# Patient Record
Sex: FEMALE | Race: WHITE
[De-identification: names, ages, dates, MRNs, and addresses within clinical notes are randomized per-mention and may not be internally consistent; named-entity substitution may affect disease eponyms.]

---

## 2023-05-03 VITALS — DIASTOLIC BLOOD PRESSURE: 75 MMHG | SYSTOLIC BLOOD PRESSURE: 115 MMHG

## 2023-05-10 ENCOUNTER — HOSPITAL ENCOUNTER (OUTPATIENT)
Dept: HOSPITAL 46 - DS | Age: 28
Discharge: HOME | End: 2023-05-10
Attending: OBSTETRICS & GYNECOLOGY
Payer: COMMERCIAL

## 2023-05-10 VITALS — HEIGHT: 62 IN | WEIGHT: 182.37 LBS | BODY MASS INDEX: 33.56 KG/M2

## 2023-05-10 VITALS — SYSTOLIC BLOOD PRESSURE: 111 MMHG | DIASTOLIC BLOOD PRESSURE: 79 MMHG

## 2023-05-10 VITALS — DIASTOLIC BLOOD PRESSURE: 77 MMHG | SYSTOLIC BLOOD PRESSURE: 126 MMHG

## 2023-05-10 VITALS — SYSTOLIC BLOOD PRESSURE: 113 MMHG | DIASTOLIC BLOOD PRESSURE: 72 MMHG

## 2023-05-10 VITALS — SYSTOLIC BLOOD PRESSURE: 127 MMHG | DIASTOLIC BLOOD PRESSURE: 81 MMHG

## 2023-05-10 DIAGNOSIS — K80.44: Primary | ICD-10-CM

## 2023-05-10 DIAGNOSIS — N94.4: ICD-10-CM

## 2023-05-10 DIAGNOSIS — N73.6: ICD-10-CM

## 2023-05-10 PROCEDURE — 0FT44ZZ RESECTION OF GALLBLADDER, PERCUTANEOUS ENDOSCOPIC APPROACH: ICD-10-PCS | Performed by: SURGERY

## 2023-05-10 PROCEDURE — 3E0P4GC INTRODUCTION OF OTHER THERAPEUTIC SUBSTANCE INTO FEMALE REPRODUCTIVE, PERCUTANEOUS ENDOSCOPIC APPROACH: ICD-10-PCS | Performed by: SURGERY

## 2023-05-10 PROCEDURE — 0UN24ZZ RELEASE BILATERAL OVARIES, PERCUTANEOUS ENDOSCOPIC APPROACH: ICD-10-PCS | Performed by: SURGERY

## 2023-05-10 PROCEDURE — BF121ZZ FLUOROSCOPY OF GALLBLADDER USING LOW OSMOLAR CONTRAST: ICD-10-PCS | Performed by: SURGERY

## 2023-05-10 NOTE — OR
[facility]
                                    [address], [place]  [address]
_________________________________________________________________________________________
                                                                 Draft    
 
 
DATE OF OPERATION:
05/10/2023
 
SURGEON:
Lin Capps MD
 
PREOPERATIVE DIAGNOSIS:
Primary dysmenorrhea, dyspareunia.
 
POSTOPERATIVE DIAGNOSIS:
Primary dysmenorrhea, dyspareunia with extensive pelvic adhesions.
 
PROCEDURE:
Laparoscopy with lysis of adhesions, chromopertubation of the tubes.
 
ESTIMATED BLOOD LOSS:
Minimal.
 
DRAINS:
None.
 
INDICATIONS AND FINDINGS:
The patient is a 28-year-old female, who has been having worsening dysmenorrhea, pelvic
pain and dyspareunia.  They do desire pregnancy, so oral contraceptives and IUDs were
contraindicated.  At the time of surgery, her exam under anesthesia was normal.  At the
time of laparoscopy, there were extensive filmy pelvic adhesions across the posterior
cul-de-sac, around both tubes and ovaries.  The left tube was found to be blocked, but
the right tube did have spill of dye.  There was no evidence of endometriosis. 
 
DESCRIPTION OF PROCEDURE:
The patient was prepped and draped in the dorsal lithotomy position.  A weighted
speculum was placed.  The anterior lip of the cervix was visualized and grasped with a
single-tooth tenaculum.  The Ajit cannula was introduced and the speculum removed.
Attention was directed above.  The infraumbilical area was injected with 0.5% Marcaine
plain.  An incision was made with a knife, and each layer was serially elevated, incised
until the fascia was opened and identified.  Stay sutures of 0 Vicryl were placed.  The
peritoneum was opened bluntly and Julia cannula was placed and the balloon inflated and
tied into place.  Placement of the scope confirmed proper positioning.  CO2 was then
introduced in the abdomen.  The abdomen was appropriately distended, a secondary port
was placed on the patient's left side.  This was slightly below the level of the
umbilicus and lateral.  The area was transilluminated, injected with the Marcaine.
Incision was made with a knife and the trocar placed under direct vision.  Following
 
                                                                                    
_________________________________________________________________________________________
PATIENT NAME:     JUAN A BARRERA                     
MEDICAL RECORD #: [id number]            OPERATIVE REPORT              
          ACCT #: [id number]  
DATE OF BIRTH:   [birth year]            REPORT #: [id number]      
PHYSICIAN:        LIN CAPPS MD            
PCP:              NO PRIMARY CARE PHYSICIAN     
REPORT IS CONFIDENTIAL AND NOT TO BE RELEASED WITHOUT AUTHORIZATION
 
 
                                  [facility]
                                    [address], [place]  [address]
_________________________________________________________________________________________
                                                                 Draft    
 
 
this, the pelvis was visualized and the extensive adhesions found.  A 3rd puncture site
was made on the patient's right side and this was done in the same manner.  This was
another 5 mm port.  The LigaSure Maryland device was then used to serially coagulate and
divide the adhesions across the posterior cul-de-sac and around the ovaries bilaterally.
 The ovaries were freed up nicely as was the cul-de-sac.  There was a deep window in the
posterior cul-de-sac, though investigation of this window did not show any endometriosis
at the base.  The contour of the uterus did appear to have maybe a small fibroid
anteriorly.  After lysis of all the adhesions, chromopertubation was done and the left
tube did not have any fill or spill.  The right tube did have nice fill and spill and
the fimbriated end on the patient's right did appear more normal compared to the left.
Following this, the abdomen was irrigated and inspected and the fluid was removed.
Preparations were made for closure.  The instruments were removed from the side ports,
though the Julia was left in place as the patient was undergoing lap bentley by different
surgeon.  The abdominal incisions were closed with subcuticular sutures of 3-0 Vicryl
Rapide.  Attention was directed down below. The instruments were removed.  There was no
evidence of any bleeding from the tenaculum site.  The White catheter placed at the
beginning of the case was also removed.  She tolerated this portion of the procedure
well and the sponge and needle counts were correct for this portion as well.  Dr. Garay
as a co-surgeon is completing his op note for the lap bentley. 
 
 
 
            ________________________________________
            Lin Capps MD 
 
 
PJW/MODL
Job #:  [id number]
DD:  05/10/2023 10:47:56
DT:  05/10/2023 11:39:25
 
 
Copies:                                
~
 
 
 
 
 
 
 
 
 
                                                                                    
_________________________________________________________________________________________
PATIENT NAME:     JUAN A BARRERA                     
MEDICAL RECORD #: [id number]            OPERATIVE REPORT              
          ACCT #: [id number]  
DATE OF BIRTH:   [birth year]            REPORT #: [id number]      
PHYSICIAN:        LIN CAPPS MD            
PCP:              NO PRIMARY CARE PHYSICIAN     
REPORT IS CONFIDENTIAL AND NOT TO BE RELEASED WITHOUT AUTHORIZATION

## 2023-05-11 NOTE — OR
[facility]
                                    [address], [place]  [address]
_________________________________________________________________________________________
                                                                 Signed   
 
 
DATE OF OPERATION:
05/10/2023
 
SURGEON:
Herminia Hernandez MD
 
PREOPERATIVE DIAGNOSIS:
Chronic cholecystitis with biliary colic.
 
POSTOPERATIVE DIAGNOSIS:
Chronic cholecystitis with biliary colic.
 
PROCEDURE:
Laparoscopic cholecystectomy without intraoperative cholangiogram.
 
ESTIMATED BLOOD LOSS:
None.
 
INDICATIONS:
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
[employer].  She and her  have been under evaluation for infertility.
She has been working closely with her gynecologist.  She is planning diagnostic
laparoscopy and possible ablation of endometriosis.  However, she was having right upper
quadrant abdominal pain radiating through to her back.  It was worse with meals.  She
has had to cut her food way down.  She has been for at least two months.  The ultrasound
was unremarkable of the gallbladder.  HIDA scan showed the liver unremarkable with a
gallbladder ejection fraction of 75%.  However, injection of the CCK reproduced her
symptoms.  In fact, she told me it was quite miserable.  She came today to discuss her
gallbladder surgery at the time of her diagnostic laparoscopy.  In the office, I gave
her a booklet on the gallbladder.  We went through it page by page.  She understands the
location and function of the gallbladder.  We reviewed laparoscopic versus open
cholecystectomy.  There is risk including, but not limited to bleeding, infection,
scarring, change in contour of the skin, damage to bowel, damage to main bile duct,
incisional hernias and other unforeseen comorbidities.  She understands the expected
intraop and postop course.  She had expressed understanding and wished to proceed. 
 
PROCEDURE NOTE:
Juan A had already been in the operating room with our gynecology service.  She has
undergone laparoscopy and lysis of rather significant adhesions in her pelvis.  Please
see Dr. Capps's operative report for those details.  Once the gynecologic procedure was
finished, I came in her room for her gallbladder surgery.  We kept the trocar in place
below the umbilicus.  We then added our two trocars in the right subcostal margin as
 
    Electronically Signed By: HERMINIA HERNANDEZ MD  05/11/23 0732
_________________________________________________________________________________________
PATIENT NAME:     JUAN A BARRERA                     
MEDICAL RECORD #: [id number]            OPERATIVE REPORT              
          ACCT #: [id number]  
DATE OF BIRTH:   [birth year]            REPORT #: [id number]      
PHYSICIAN:        HERMINIA HERNANDEZ MD             
PCP:              NO PRIMARY CARE PHYSICIAN     
REPORT IS CONFIDENTIAL AND NOT TO BE RELEASED WITHOUT AUTHORIZATION
 
 
                                  [facility]
                                    [address] [place] [address]  [address]
_________________________________________________________________________________________
                                                                 Signed   
 
 
well as the subxiphoid trocar under direct visualization of the camera without
difficulty.  She had no adhesions around the liver or gallbladder.  The gallbladder was
grasped and elevated in the right upper quadrant.  The triangle of Calot was dissected
free with the help of a Maryland dissector.  She was in good position on this particular
operating room table for cholangiogram.  In addition, she has no gallstones.  We decided
to forego the cholangiogram.  We placed three sequential clips across the cystic duct
and it was divided.  We placed two clips across the cystic artery and it was divided.
The gallbladder was then removed from the gallbladder fossa with the help of the
cautery.  The gallbladder was placed into an EndoCatch bag.  After this, we used our
laparoscopic suturing device to pass 0-Vicryl suture on either side of the fascia of the
subxiphoid trocar site.  This was tied down to close this fascia primarily.  After this,
the gas was allowed to escape and the remaining trocars were removed.  We closed the
fascia of the infraumbilical trocar site with interrupted figure-of-eight 0 Vicryl
sutures.  Local anesthetic was injected into all trocar sites.  Each trocar site was
irrigated and suctioned out until clear.  The skin and dermis of each trocar site were
closed with interrupted 3-0 subcuticular and Monocryl sutures.  Dry gauze and tape were
then applied to all incisions.  After this, Juan A was awakened from anesthesia,
extubated in the OR, and taken to the recovery room in stable condition. 
 
 
 
            ________________________________________
            Herminia Hernandez MD 
 
 
ALB/MODL
Job #:  [id number]
DD:  05/10/2023 10:24:39
DT:  05/10/2023 10:59:43
 
cc:            MD Sangita [place] MD
 
 
Copies:  HERMINIA HERNANDEZ MD, PATRICIA J MD
~
 
 
 
 
 
    Electronically Signed By: HERMINIA HERNANDEZ MD  05/11/23 0732
_________________________________________________________________________________________
PATIENT NAME:     JUAN A BARRERA                     
MEDICAL RECORD #: [id number]            OPERATIVE REPORT              
          ACCT #: [id number]  
DATE OF BIRTH:   [birth year]            REPORT #: [id number]      
PHYSICIAN:        HERMINIA HERNANDEZ MD             
PCP:              NO PRIMARY CARE PHYSICIAN     
REPORT IS CONFIDENTIAL AND NOT TO BE RELEASED WITHOUT AUTHORIZATION

## 2023-05-12 NOTE — PATH
[facility]
                                    [address] [place], [place]  [address]
_________________________________________________________________________________________
                                                                 Signed   
 
 
 
SPECIMEN(S): A GALLBLADDER
 
SPECIMEN SOURCE:
A. GALLBLADDER
 
CLINICAL HISTORY:
Acute cholecystitis, dysmenorrhea, dyspareunia.
 
FINAL PATHOLOGIC DIAGNOSIS:
Gallbladder, cholecystectomy:
-  Benign gallbladder with chronic mucosal inflammation consistent with chronic 
cholecystitis. 
-  Negative for calculi.
JVR:letitia:C2NR
 
MICROSCOPIC EXAMINATION:
Histologic sections of all submitted blocks are examined by light microscopy.  
These findings, together with the gross examination, support the pathologic 
diagnosis. 
 
GROSS DESCRIPTION:
The specimen, labeled and designated "DENI Joaquin," and designated on the 
requisition "gallbladder," is received in formalin and consists of 
Specimen: Previously opened gallbladder.
Dimensions: 6.0 x 3.5 x 1.2 cm.
Serosa: Tan-pink, smooth.
Cystic Duct: Unobstructed, margin inked blue and shaved.
Calculi: Not grossly identified.
Mucosa: Brown-tan velvety.
Wall thickness: 0.3 cm.
Lymph node: No pericystic lymph nodes are grossly identified.
Additional: None.
Representative sections are submitted in (A1).
AC  (under the direct supervision of a pathologist)
The Gross Description was prepared using a voice recognition system. The report 
was reviewed for accuracy; however, sound-alike word errors, addition and/or 
deletions may occur. If there is any 
question about this report, please contact Client Services.
 
PERFORMING LABORATORY:
The technical component was performed by [facility], [address], 
 
                                                                                    
_________________________________________________________________________________________
PATIENT NAME:     JUAN A JOAQUIN                     
MEDICAL RECORD #: [id number]            PATHOLOGY                     
          ACCT #: [id number]       ACCESSION #: [id number]     
DATE OF BIRTH:   [birth year]            REPORT #: [id number]       
PHYSICIAN:        [facility]              
PCP:              NO PRIMARY CARE PHYSICIAN     
REPORT IS CONFIDENTIAL AND NOT TO BE RELEASED WITHOUT AUTHORIZATION
 
 
                                  [facility]
                                    [address] [facility]
                                  [place], [place]  [address]
_________________________________________________________________________________________
                                                                 Signed   
 
 
[place] [address] (CLIA# [id number]).  Professional interpretation was 
performed by [facility], 
[address] [place] [address] [place] [address] (CLIA#: [id number]).
 
Diagnostician:  Maurice Simons MD
Pathologist
Electronically Signed 05/12/2023
 
 
Copies:                                
~
 
 
 
 
 
 
 
 
 
 
 
 
 
 
 
 
 
 
 
 
 
 
 
 
 
 
 
 
 
 
 
 
                                                                                    
_________________________________________________________________________________________
PATIENT NAME:     JUAN A JOAQUIN                     
MEDICAL RECORD #: [id number]            PATHOLOGY                     
          ACCT #: [id number]       ACCESSION #: [id number]     
DATE OF BIRTH:   [birth year]            REPORT #: [id number]       
PHYSICIAN:        [facility]              
PCP:              NO PRIMARY CARE PHYSICIAN     
REPORT IS CONFIDENTIAL AND NOT TO BE RELEASED WITHOUT AUTHORIZATION

## 2024-08-28 ENCOUNTER — HOSPITAL ENCOUNTER (EMERGENCY)
Dept: HOSPITAL 46 - ED | Age: 29
Discharge: HOME | End: 2024-08-28
Payer: COMMERCIAL

## 2024-08-28 VITALS — DIASTOLIC BLOOD PRESSURE: 80 MMHG | SYSTOLIC BLOOD PRESSURE: 121 MMHG

## 2024-08-28 VITALS — HEIGHT: 62 IN | WEIGHT: 194.23 LBS | BODY MASS INDEX: 35.74 KG/M2

## 2024-08-28 DIAGNOSIS — Z88.8: ICD-10-CM

## 2024-08-28 DIAGNOSIS — Z79.899: ICD-10-CM

## 2024-08-28 DIAGNOSIS — N93.8: Primary | ICD-10-CM

## 2024-08-28 LAB
ALBUMIN SERPL-MCNC: 3.7 G/DL (ref 3.4–5)
ALBUMIN/GLOB SERPL: 1.16 {RATIO} (ref 1.1–2.4)
ALP SERPL-CCNC: 90 U/L (ref 46–116)
ALT SERPL W P-5'-P-CCNC: 31 U/L (ref 14–59)
ANION GAP SERPL CALCULATED.4IONS-SCNC: 13.5 MMOL/L (ref 7–21)
AST SERPL-CCNC: 21 U/L (ref 15–37)
BASOPHILS NFR BLD AUTO: 0.4 % (ref 0–2)
BUN SERPL-MCNC: 8 MG/DL (ref 7–18)
BUN/CREAT SERPL: 8.98 (ref 6–28.6)
CALCIUM SERPL-MCNC: 8.5 MG/DL (ref 8.5–10.1)
CHLORIDE SERPL-SCNC: 102 MMOL/L (ref 98–107)
CO2 SERPL-SCNC: 27 MMOL/L (ref 21–32)
DEPRECATED RDW RBC AUTO: 15.5 FL (ref 10.5–15)
EGFRCR SERPLBLD CKD-EPI 2021: 91 ML/MIN (ref 60–?)
EOSINOPHIL NFR BLD AUTO: 0.8 % (ref 0–6)
GLOBULIN SER-MCNC: 3.2 G/DL (ref 1.8–3.5)
HCT VFR BLD AUTO: 39.4 % (ref 35–50)
HGB BLD-MCNC: 12.9 G/DL (ref 12–18)
LYMPHOCYTES NFR BLD AUTO: 28.5 % (ref 24–44)
MCH RBC QN AUTO: 26 PG (ref 27–36)
MCHC RBC AUTO-ENTMCNC: 32.8 G/DL (ref 30–36)
MCV RBC AUTO: 79.2 FL (ref 81–99)
MONOCYTES NFR BLD AUTO: 5.5 % (ref 0–12)
NEUTROPHILS NFR BLD AUTO: 64.8 % (ref 39–80)
PLATELET # BLD AUTO: 303 K/UL (ref 140–440)
POTASSIUM SERPL-SCNC: 3.5 MMOL/L (ref 3.5–5.1)
PROT SERPL-MCNC: 6.9 G/DL (ref 6.4–8.2)
RBC # BLD AUTO: 4.98 M/UL (ref 4.3–5.7)